# Patient Record
Sex: FEMALE | Race: WHITE | URBAN - METROPOLITAN AREA
[De-identification: names, ages, dates, MRNs, and addresses within clinical notes are randomized per-mention and may not be internally consistent; named-entity substitution may affect disease eponyms.]

---

## 2024-06-15 NOTE — PROGRESS NOTES
Neurology Note    Patient ID:  Liberty Doe  [id number]  68 y.o.  1955      Date of Consultation:  June 17, 2024    Referring Physician: Dr. Barbour    Reason for Consultation:  memory loss      Assessment and Plan:    The patient is a pleasant 68-year-old female who presents with a 2 to 3-year history of slow cognitive decline.  Her current neurological examination reveals Jarvis cognitive assessment score of 29 out of 30.    Memory loss:  Differential includes mild cognitive impairment with aging, early onset dementia, metabolic derangement  I will order a TSH and vitamin B12  Brain MRI will be ordered.  The patient will be followed closely in the neurology clinic.  May need to consider obtaining neuropsych testing in the future  I did discuss with the patient the importance of performing cognitively stimulating activity and physical activity daily  Discussed the importance of healthy diet and exercise      Intermittent lightheadedness:  Discussed with the patient's the importance of adequate hydration.  She drinks very little in the way of noncaffeinated beverage.  She should occasionally check her blood pressure at home  She did have a normal neurological examination  Should follow up with her pcp             Subjective: Memory loss       History of Present Illness:   Liberty Doe is a 68 y.o. female who was referred to the neurology clinic at [facility] for an evaluation.  She was referred for forgetfulness and dizziness.  The patient states that she has noticed some increasing frequency of forgetfulness over the past couple years.  Patient mostly little things.  She does not notice that she needs to write things on the calendar.  There is no 1 specific time when this began but has gradually worsened over the past few years.  She does live with her daughter and her daughter has noticed that she is a bit more forgetful.  There is never issues with paying bills or doing activities in

## 2024-06-17 ENCOUNTER — OFFICE VISIT (OUTPATIENT)
Age: 69
End: 2024-06-17
Payer: MEDICARE

## 2024-06-17 VITALS
DIASTOLIC BLOOD PRESSURE: 74 MMHG | SYSTOLIC BLOOD PRESSURE: 120 MMHG | OXYGEN SATURATION: 98 % | HEIGHT: 62 IN | HEART RATE: 70 BPM | RESPIRATION RATE: 15 BRPM | BODY MASS INDEX: 28.71 KG/M2 | WEIGHT: 156 LBS

## 2024-06-17 DIAGNOSIS — R41.3 MEMORY LOSS: Primary | ICD-10-CM

## 2024-06-17 DIAGNOSIS — R42 ORTHOSTATIC LIGHTHEADEDNESS: ICD-10-CM

## 2024-06-17 DIAGNOSIS — R41.3 MEMORY LOSS: ICD-10-CM

## 2024-06-17 LAB
TSH SERPL DL<=0.05 MIU/L-ACNC: 1.51 UIU/ML (ref 0.36–3.74)
VIT B12 SERPL-MCNC: 533 PG/ML (ref 193–986)

## 2024-06-17 PROCEDURE — G8400 PT W/DXA NO RESULTS DOC: HCPCS | Performed by: PSYCHIATRY & NEUROLOGY

## 2024-06-17 PROCEDURE — 99205 OFFICE O/P NEW HI 60 MIN: CPT | Performed by: PSYCHIATRY & NEUROLOGY

## 2024-06-17 PROCEDURE — 1036F TOBACCO NON-USER: CPT | Performed by: PSYCHIATRY & NEUROLOGY

## 2024-06-17 PROCEDURE — 1090F PRES/ABSN URINE INCON ASSESS: CPT | Performed by: PSYCHIATRY & NEUROLOGY

## 2024-06-17 PROCEDURE — 1123F ACP DISCUSS/DSCN MKR DOCD: CPT | Performed by: PSYCHIATRY & NEUROLOGY

## 2024-06-17 PROCEDURE — G8427 DOCREV CUR MEDS BY ELIG CLIN: HCPCS | Performed by: PSYCHIATRY & NEUROLOGY

## 2024-06-17 PROCEDURE — G8419 CALC BMI OUT NRM PARAM NOF/U: HCPCS | Performed by: PSYCHIATRY & NEUROLOGY

## 2024-06-17 PROCEDURE — 3017F COLORECTAL CA SCREEN DOC REV: CPT | Performed by: PSYCHIATRY & NEUROLOGY

## 2024-06-17 RX ORDER — TRAZODONE HYDROCHLORIDE 50 MG/1
50 TABLET ORAL 2 TIMES DAILY
COMMUNITY
Start: 2024-03-25

## 2024-06-17 RX ORDER — ATORVASTATIN CALCIUM 10 MG/1
1 TABLET, FILM COATED ORAL DAILY
COMMUNITY
Start: 2024-04-01

## 2024-06-17 RX ORDER — ASPIRIN 81 MG
TABLET, DELAYED RELEASE (ENTERIC COATED) ORAL AS NEEDED
COMMUNITY

## 2024-06-17 RX ORDER — MULTIVIT-MIN/IRON/FOLIC ACID/K 18-600-40
CAPSULE ORAL DAILY
COMMUNITY

## 2024-06-17 ASSESSMENT — PATIENT HEALTH QUESTIONNAIRE - PHQ9
1. LITTLE INTEREST OR PLEASURE IN DOING THINGS: NOT AT ALL
2. FEELING DOWN, DEPRESSED OR HOPELESS: NOT AT ALL
SUM OF ALL RESPONSES TO PHQ QUESTIONS 1-9: 0
SUM OF ALL RESPONSES TO PHQ9 QUESTIONS 1 & 2: 0
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
## 2024-06-18 ENCOUNTER — TELEPHONE (OUTPATIENT)
Age: 69
End: 2024-06-18

## 2024-06-18 NOTE — TELEPHONE ENCOUNTER
----- Message from Jefe Rosas DO sent at 6/17/2024  5:10 PM EDT -----  Please let patient know that her labs were normal.  She was not on mychart.    Thanks.    ----- Message -----  From: [facility] [employer] Incoming [place] [facility]  Sent: 6/17/2024   2:40 PM EDT  To: Jefe Rosas DO        Called pt,verified pt with two pt identifiers,  advised pt her labs were normal. Advised pt of central scheduling # to call if she does not hear from them to schedule the MRI. Pt advised she will wait for them to call  to schedule. Pt thanked me for call and verbalized understanding of results.

## 2024-07-20 ENCOUNTER — HOSPITAL ENCOUNTER (OUTPATIENT)
Facility: HOSPITAL | Age: 69
End: 2024-07-20
Attending: PSYCHIATRY & NEUROLOGY
Payer: MEDICARE

## 2024-07-20 DIAGNOSIS — R41.3 MEMORY LOSS: ICD-10-CM

## 2024-07-20 PROCEDURE — 70551 MRI BRAIN STEM W/O DYE: CPT

## 2024-07-22 ENCOUNTER — TELEPHONE (OUTPATIENT)
Age: 69
End: 2024-07-22

## 2024-07-22 NOTE — TELEPHONE ENCOUNTER
----- Message from Jefe Rosas DO sent at 7/21/2024 11:54 AM EDT -----    Hi,    Can you let the patient know that her brain MRI was essentially normal.  There is no evidence of a stroke or any significant abnormality.    This is good news    Thanks  Dr. Rosas  ----- Message -----  From: [facility] Incoming Orders Results To [facility]  Sent: 7/20/2024   2:36 PM EDT  To: Jefe Rosas DO        Called pt,verified pt with two pt identifiers, advised pt her brain MRI was essentially normal. No evidence of a stroke or any significant abnormality. Advised this is very good news. Pt in agreement. She verbalized understanding of MRI result.